# Patient Record
Sex: FEMALE | Employment: FULL TIME | URBAN - METROPOLITAN AREA
[De-identification: names, ages, dates, MRNs, and addresses within clinical notes are randomized per-mention and may not be internally consistent; named-entity substitution may affect disease eponyms.]

---

## 2017-04-30 ENCOUNTER — OFFICE VISIT (OUTPATIENT)
Dept: URGENT CARE | Facility: RETAIL CLINIC | Age: 37
End: 2017-04-30
Payer: COMMERCIAL

## 2017-04-30 VITALS — HEART RATE: 72 BPM | SYSTOLIC BLOOD PRESSURE: 109 MMHG | DIASTOLIC BLOOD PRESSURE: 72 MMHG | TEMPERATURE: 98.9 F

## 2017-04-30 DIAGNOSIS — J02.9 ACUTE PHARYNGITIS, UNSPECIFIED ETIOLOGY: Primary | ICD-10-CM

## 2017-04-30 LAB — S PYO AG THROAT QL IA.RAPID: NORMAL

## 2017-04-30 PROCEDURE — 99202 OFFICE O/P NEW SF 15 MIN: CPT | Performed by: FAMILY MEDICINE

## 2017-04-30 PROCEDURE — 87081 CULTURE SCREEN ONLY: CPT | Performed by: FAMILY MEDICINE

## 2017-04-30 PROCEDURE — 87880 STREP A ASSAY W/OPTIC: CPT | Mod: QW | Performed by: FAMILY MEDICINE

## 2017-04-30 NOTE — PROGRESS NOTES
SUBJECTIVE:  Tanvi Valdes is a 36 year old female with a chief complaint of sore throat.  Onset of symptoms was 2 day(s) ago.    Course of illness: gradual onset, still present and constant.  Severity mild  Current and Associated symptoms: nasal congestion, ear pain right, sore throat and malaise  Treatment measures tried include Fluids and Rest.  Predisposing factors include strep exposure.    No past medical history on file.  Current Outpatient Prescriptions   Medication Sig Dispense Refill     Fish Oil-Cholecalciferol (FISH OIL + D3) 9395-2569 MG-UNIT CAPS        Olopatadine HCl (PATANASE NA)        levonorgestrel (MIRENA, 52 MG,) 20 MCG/24HR IUD 1 each by Intrauterine route once       History   Smoking Status     Not on file   Smokeless Tobacco     Not on file       ROS:  CONSTITUTIONAL:POSITIVE  for fatigue and malaise  ENT/MOUTH: POSITIVE for sore throat    OBJECTIVE:   /72 (BP Location: Left arm)  Pulse 72  Temp 98.9  F (37.2  C) (Tympanic)  GENERAL APPEARANCE: healthy, alert and no distress  EYES: EOMI,  PERRL, conjunctiva clear  HENT: ear canals and TM's normal.  Nose normal.  Pharynx erythematous with some exudate noted.  NECK: supple, non-tender to palpation, no adenopathy noted  RESP: lungs clear to auscultation - no rales, rhonchi or wheezes  CV: regular rates and rhythm, normal S1 S2, no murmur noted  ABDOMEN:  soft, nontender, no HSM or masses and bowel sounds normal  SKIN: no suspicious lesions or rashes    Rapid Strep test is negative; await throat culture results.    ASSESSMENT:  Acute pharyngitis, unspecified etiology    PLAN:Strep culture pending  Symptomatic treat with gargles, lozenges, and OTC analgesic as needed.   Follow-up with primary care provider if not improving.

## 2017-04-30 NOTE — MR AVS SNAPSHOT
After Visit Summary   4/30/2017    Tanvi Valdes    MRN: [id number]           Patient Information     Date Of Birth          1980        Visit Information        Provider Department      4/30/2017 1:20 PM Sundar Crouch MD [facility]        Today's Diagnoses     Acute pharyngitis, unspecified etiology    -  1      Care Instructions       * PHARYNGITIS (Sore Throat),REPORT PENDING    Pharyngitis (sore throat) is often due to a virus, but can also be caused by the  strep  bacteria. This is called  strep throat . Both viral and strep infection can cause throat pain that is worse when swallowing, aching all over with headache and fever. Both types of infections are contagious. They may be spread by coughing, kissing or touching others after touching your mouth or nose, so wash your hands often.  A test has been done to determine whether or not you have strep throat. If it is positive for strep infection you will usually need to take antibiotics. If the test is negative, you probably have a viral pharyngitis, and antibiotic treatment will not help you recover.  HOME CARE:    If your symptoms are severe, rest at home for the first 2-3 days. If you are told that your test is positive for strep, you should be off work and school for the first two days of antibiotic treatment. After that, you will no longer be as contagious.    Children: Use acetaminophen (Tylenol) for fever, fussiness or discomfort. In infants over six months of age, you may use ibuprofen (Children's Motrin) instead of Tylenol. [NOTE: If your child has chronic liver or kidney disease or ever had a stomach ulcer or GI bleeding, talk with your doctor before using these medicines.]   (Aspirin should never be used in anyone under 18 years of age who is ill with a fever. It may cause severe liver damage.)  Adults: You may use acetaminophen (Tylenol) 650-1000 mg every 6 hours or ibuprofen (Motrin, Advil) 600 mg  "every 6-8 hours with food to control pain, if you are able to take these medicines. [NOTE: If you have chronic liver or kidney disease or ever had a stomach ulcer or GI bleeding, talk with your doctor before using these medicines.]    Throat lozenges or sprays (Chloraseptic and others), or gargling with warm salt water will reduce throat pain. Dissolve 1/2 teaspoon of salt in 1 glass of warm water. This is especially useful just before meals.     FOLLOW UP with your doctor as advised by our staff if you are not improving over the next week.  GET PROMPT MEDICAL ATTENTION  if any of the following occur:    Fever over 101 F (38.3 C) for more than three days    New or worsening ear pain, sinus pain or headache    Unable to swallow liquids or open your mouth wide due to throat pain    Trouble breathing    Muffled voice    New rash       [id number] [facility], [address] [place] [address]. All rights reserved. This information is not intended as a substitute for professional medical care. Always follow your healthcare professional's instructions.          Follow-ups after your visit        Who to contact     You can reach your care team any time of the day by calling [phone].  Notification of test results:  If you have an abnormal lab result, we will notify you by phone as soon as possible.         Additional Information About Your Visit        Be[employer] Information     Be[employer]io lets you send messages to your doctor, view your test results, renew your prescriptions, schedule appointments and more. To sign up, go to [email or web address] . Click on \"Log in\" on the left side of the screen, which will take you to the Welcome page. Then click on \"Sign up Now\" on the right side of the page.     You will be asked to enter the access code listed below, as well as some personal information. Please follow the directions to create your username and password.     Your access code is: [email or web address]  Expires: " 2017  1:55 PM     Your access code will  in 90 days. If you need help or a new code, please call your [facility] clinic or [phone].        Care EveryWhere ID     This is your Care EveryWhere ID. This could be used by other organizations to access your [facility] medical records  [id number]        Your Vitals Were     Pulse Temperature                72 98.9  F (37.2  C) (Tympanic)           Blood Pressure from Last 3 Encounters:   17 109/72    Weight from Last 3 Encounters:   No data found for Wt              We Performed the Following     BETA STREP GROUP A R/O CULTURE     RAPID STREP SCREEN        Primary Care Provider    None Specified       No primary provider on file.        Thank you!     Thank you for choosing [facility]  for your care. Our goal is always to provide you with excellent care. Hearing back from our patients is one way we can continue to improve our services. Please take a few minutes to complete the written survey that you may receive in the mail after your visit with us. Thank you!             Your Updated Medication List - Protect others around you: Learn how to safely use, store and throw away your medicines at www.disposemymeds.org.          This list is accurate as of: 17  1:55 PM.  Always use your most recent med list.                   Brand Name Dispense Instructions for use    FISH OIL + D3 0331-5205 MG-UNIT Caps          MIRENA (52 MG) 20 MCG/24HR IUD   Generic drug:  levonorgestrel      1 each by Intrauterine route once       PATANASE NA

## 2017-04-30 NOTE — NURSING NOTE
Chief Complaint   Patient presents with     Throat Pain     all symptoms off and on, 2 days; hx of sinus infections, allergies per pt     Otalgia     Headache       Initial /72 (BP Location: Left arm)  Pulse 72  Temp 98.9  F (37.2  C) (Tympanic) There is no height or weight on file to calculate BMI.  Medication Reconciliation: complete

## 2017-04-30 NOTE — PATIENT INSTRUCTIONS

## 2017-05-02 LAB — BETA STREP CONFIRM: NORMAL

## 2022-06-15 ENCOUNTER — TRANSCRIBE ORDERS (OUTPATIENT)
Dept: OTHER | Age: 42
End: 2022-06-15

## 2022-06-15 DIAGNOSIS — R32 URINARY INCONTINENCE, UNSPECIFIED TYPE: Primary | ICD-10-CM

## 2022-07-14 ENCOUNTER — HOSPITAL ENCOUNTER (OUTPATIENT)
Dept: PHYSICAL THERAPY | Facility: CLINIC | Age: 42
Setting detail: THERAPIES SERIES
Discharge: HOME | End: 2022-07-14
Attending: FAMILY MEDICINE
Payer: COMMERCIAL

## 2022-07-14 DIAGNOSIS — R32 URINARY INCONTINENCE, UNSPECIFIED TYPE: ICD-10-CM

## 2022-07-14 PROCEDURE — 97530 THERAPEUTIC ACTIVITIES: CPT | Mod: GP | Performed by: PHYSICAL THERAPIST

## 2022-07-14 PROCEDURE — 90913 BFB TRAINING EA ADDL 15 MIN: CPT | Mod: GP | Performed by: PHYSICAL THERAPIST

## 2022-07-14 PROCEDURE — 90912 BFB TRAINING 1ST 15 MIN: CPT | Mod: GP | Performed by: PHYSICAL THERAPIST

## 2022-07-14 PROCEDURE — 97161 PT EVAL LOW COMPLEX 20 MIN: CPT | Mod: GP | Performed by: PHYSICAL THERAPIST

## 2022-07-21 ENCOUNTER — HOSPITAL ENCOUNTER (OUTPATIENT)
Dept: PHYSICAL THERAPY | Facility: CLINIC | Age: 42
Setting detail: THERAPIES SERIES
Discharge: HOME | End: 2022-07-21
Attending: FAMILY MEDICINE
Payer: COMMERCIAL

## 2022-07-21 PROCEDURE — 90912 BFB TRAINING 1ST 15 MIN: CPT | Mod: GP | Performed by: PHYSICAL THERAPIST

## 2022-07-21 PROCEDURE — 97112 NEUROMUSCULAR REEDUCATION: CPT | Mod: GP | Performed by: PHYSICAL THERAPIST

## 2022-07-21 PROCEDURE — 90913 BFB TRAINING EA ADDL 15 MIN: CPT | Mod: GP | Performed by: PHYSICAL THERAPIST

## 2022-07-26 NOTE — PROGRESS NOTES
07/14/22 1000   General Information   Type of Visit Initial OP Ortho PT Evaluation   Start of Care Date 07/14/22   Referring Physician Bev Tate MD   Patient/Family Goals Statement improve bladder control with exercise   Orders Evaluate and Treat   Date of Order 06/14/22   Certification Required? No   Medical Diagnosis urinary incontinence   Surgical/Medical history reviewed Yes   Precautions/Limitations no known precautions/limitations   Body Part(s)   Body Part(s) Pelvic Floor Dysfunction   Presentation and Etiology   Pertinent history of current problem (include personal factors and/or comorbidities that impact the POC) Pt reports chronic incontinence, started with CrossFit and weigfht lifting.  Pt reports with certain lifts, when ill (puking or strong coughs), sometimes with large sneezing.  Pt reports increased symptoms when standing vs sitting.  Denies bowel or pelvic pain issues   Impairments P. Bowel or bladder problems   Functional Limitations perform activities of daily living;perform desired leisure / sports activities   Symptom Location pelvic floor   Onset date of current episode/exacerbation 07/01/15   Chronicity Chronic   Current Level of Function   Patient role/employment history A. 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 No   Have you fallen and had an injury in the past year? No   Is patient a fall risk? No   Abuse Screen (yes response referral indicated)   Feels Unsafe at Home or Work/School no   Feels Threatened by Someone no   Does Anyone Try to Keep You From Having Contact with Others or Doing Things Outside Your Home? no   Physical Signs of Abuse Present no   Specific Questions   Specific Questions Pelvic Floor Dysfunction   Pelvic Floor Dysfunction Questions   Regular exercise Yes  (hx of CrossFit, currently walking)   Fluid  "intake-glasses/day (one glass/cup = 8oz 16 -30 oz approx   Caffeinated beverages-glasses/day minimal   Alcoholic beverages - glasses/day 4 drinks a week   Recent diet change? No   How long can you delay the need to urinate?  yes   How many times do you wake to urinate at night?   no   How often do you urinate during the day?   3- 5 depending on work   Can you stop the flow of urine when on the toilet?  Yes   Is the volume of urine passed usually  Average   Do you have the sensation that you need to go to the toilet?  Yes   Do you empty your bladder frequently, before you experience the urge to pass urine?  No   Do you have \"triggers\" that make you feel you can't wait to go to the toilet?  No   Number of bladder infections last year?  no   Frequency of bowel movements:  daily 1-2   Consistency of stool?  Soft formed   Do you ignore the urge to defecate?  Yes   Pelvic Floor Dysfunction Objective Findings   Power (MMT at Levator Ani) 3   Endurance (Up to 10 seconds as long as still 50% power) 8   Repetitions (Contract 10 seconds or MVC, rest 4 seconds and count max number of reps) 3   Fast Twitch (Number of 1 second contractions can do in 10 seconds. Norm=7 reps) 4   Elevation (Able to lift posterior vaginal wall toward head and pubic bone) Absent   Type of Storage Problem stress incontinence  (occasional urgency)   Baseline EMG Pelvic Muscles (mv) 0.3   Baseline EMG abdominals (mv) 1.0   Peak Pelvic Muscle Contraction Work -avg 2 sec (mv) 4.2   Peak Pelvic Muscle Contraction Work-avg 10 sec (mv) 6.2   Protection needed Pad;Number of pads per day   Pad liner   Number of pads 2   Planned Therapy Interventions   Planned Therapy Interventions ADL retraining;IADL retraining;fine motor coordination training;motor coordination training;neuromuscular re-education;manual therapy;ROM;strengthening;stretching   Planned Modality Interventions   Planned Modality Interventions Biofeedback   Clinical Impression   Criteria for " Skilled Therapeutic Interventions Met yes, treatment indicated   PT Diagnosis stress incontinence   Influenced by the following impairments weakness, decreased muscle control   Functional limitations due to impairments bladder control with stress and exercise regime   Clinical Presentation Stable/Uncomplicated   Clinical Presentation Rationale clinical judgement   Clinical Decision Making (Complexity) Low complexity   Therapy Frequency 1 time/week   Predicted Duration of Therapy Intervention (days/wks) 90 days   Risk & Benefits of therapy have been explained Yes   Patient, Family & other staff in agreement with plan of care Yes   Pelvic Health Informed Consent Statement Discussed with patient/guardian reason for referral regarding pelvic health needs and external/internal pelvic floor muscle examination.  Opportunity provided to ask questions and verbal consent for assessment and intervention was given.   Clinical Impression Comments The patient is a 42 yo female who was referred to outpaitent physical therapy for evaluation and treatment of urinary incontinence; patient describes stress incontinence with high level activities.  Skilled physical therapy is required in order to improve bladder control with exercise regime.   Education Assessment   Preferred Learning Style Listening;Demonstration;Pictures/video   Barriers to Learning No barriers   ORTHO GOALS   PT Ortho Eval Goals 1;2   Ortho Goal 1   Goal Identifier 1   Goal Description The patient will deny leaking with strong cough or sneeze in order to improve bladder control   Target Date 10/12/22   Ortho Goal 2   Goal Identifier 2   Goal Description The patient will be able to demonstrate basic power lift or crossfit movement without leaking in order to return to exercise regime   Target Date 10/12/22   Total Evaluation Time   PT Eval, Low Complexity Minutes (46925) 25         Radha Swenson, PT, DPT, OCS, CSCS  [facility]  [facility]  [phone]

## 2022-07-27 ENCOUNTER — HOSPITAL ENCOUNTER (OUTPATIENT)
Dept: PHYSICAL THERAPY | Facility: CLINIC | Age: 42
Setting detail: THERAPIES SERIES
Discharge: HOME | End: 2022-07-27
Attending: FAMILY MEDICINE
Payer: COMMERCIAL

## 2022-07-27 PROCEDURE — 97112 NEUROMUSCULAR REEDUCATION: CPT | Mod: GP | Performed by: PHYSICAL THERAPIST

## 2022-08-03 ENCOUNTER — HOSPITAL ENCOUNTER (OUTPATIENT)
Dept: PHYSICAL THERAPY | Facility: CLINIC | Age: 42
Setting detail: THERAPIES SERIES
Discharge: HOME | End: 2022-08-03
Attending: FAMILY MEDICINE
Payer: COMMERCIAL

## 2022-08-03 PROCEDURE — 97112 NEUROMUSCULAR REEDUCATION: CPT | Mod: GP | Performed by: PHYSICAL THERAPIST

## 2022-08-03 PROCEDURE — 90912 BFB TRAINING 1ST 15 MIN: CPT | Mod: GP | Performed by: PHYSICAL THERAPIST

## 2022-08-17 ENCOUNTER — HOSPITAL ENCOUNTER (OUTPATIENT)
Dept: PHYSICAL THERAPY | Facility: CLINIC | Age: 42
Setting detail: THERAPIES SERIES
Discharge: HOME | End: 2022-08-17
Attending: FAMILY MEDICINE
Payer: COMMERCIAL

## 2022-08-17 PROCEDURE — 97530 THERAPEUTIC ACTIVITIES: CPT | Mod: GP | Performed by: PHYSICAL THERAPIST

## 2022-08-17 PROCEDURE — 97112 NEUROMUSCULAR REEDUCATION: CPT | Mod: GP | Performed by: PHYSICAL THERAPIST

## 2022-09-01 ENCOUNTER — HOSPITAL ENCOUNTER (OUTPATIENT)
Dept: PHYSICAL THERAPY | Facility: CLINIC | Age: 42
Setting detail: THERAPIES SERIES
Discharge: HOME | End: 2022-09-01
Attending: FAMILY MEDICINE
Payer: COMMERCIAL

## 2022-09-01 PROCEDURE — 97112 NEUROMUSCULAR REEDUCATION: CPT | Mod: GP | Performed by: PHYSICAL THERAPIST

## 2022-10-04 NOTE — PROGRESS NOTES
[facility] Rehabilitation [facility]    Outpatient Physical Therapy Discharge Note  Patient: Tanvi Hernández  : 1980    Beginning/End Dates of Reporting Period:  22 to 22    Referring Provider: Bev Tate MD    Therapy Diagnosis: stress incontinence     Client Self Report: Pt reports one instance of stress incontinence with laughing after full bladder/drinking but much less than normally would have been.    Objective Measurements:    Goals:  Goal Identifier 1   Goal Description The patient will deny leaking with strong cough or sneeze in order to improve bladder control   Target Date 10/12/22   Date Met   22   Progress (detail required for progress note):       Goal Identifier 2   Goal Description The patient will be able to demonstrate basic power lift or crossfit movement without leaking in order to return to exercise regime   Target Date 10/12/22   Date Met   22   Progress (detail required for progress note):         Plan:  Discharge from therapy.    Discharge:    Reason for Discharge: Patient has met all goals.    Discharge Plan: Patient to continue home program.    Radha Swenson, PT, DPT, OCS, CSCS  [facility]  [phone]